# Patient Record
Sex: FEMALE
[De-identification: names, ages, dates, MRNs, and addresses within clinical notes are randomized per-mention and may not be internally consistent; named-entity substitution may affect disease eponyms.]

---

## 2022-05-31 ENCOUNTER — APPOINTMENT (OUTPATIENT)
Dept: ORTHOPEDIC SURGERY | Facility: CLINIC | Age: 22
End: 2022-05-31
Payer: MEDICAID

## 2022-05-31 VITALS — HEIGHT: 64 IN | WEIGHT: 155 LBS | BODY MASS INDEX: 26.46 KG/M2

## 2022-05-31 PROBLEM — Z00.00 ENCOUNTER FOR PREVENTIVE HEALTH EXAMINATION: Status: ACTIVE | Noted: 2022-05-31

## 2022-05-31 PROCEDURE — 99204 OFFICE O/P NEW MOD 45 MIN: CPT

## 2022-07-12 ENCOUNTER — APPOINTMENT (OUTPATIENT)
Dept: ORTHOPEDIC SURGERY | Facility: CLINIC | Age: 22
End: 2022-07-12

## 2022-08-23 ENCOUNTER — APPOINTMENT (OUTPATIENT)
Dept: ORTHOPEDIC SURGERY | Facility: CLINIC | Age: 22
End: 2022-08-23

## 2022-08-23 VITALS — HEIGHT: 64 IN | BODY MASS INDEX: 26.46 KG/M2 | WEIGHT: 155 LBS

## 2022-08-23 DIAGNOSIS — M43.16 SPONDYLOLISTHESIS, LUMBAR REGION: ICD-10-CM

## 2022-08-23 DIAGNOSIS — M43.07 SPONDYLOLYSIS, LUMBOSACRAL REGION: ICD-10-CM

## 2022-08-23 PROCEDURE — 99214 OFFICE O/P EST MOD 30 MIN: CPT

## 2022-08-23 NOTE — HISTORY OF PRESENT ILLNESS
[Lower back] : lower back [8] : 8 [10] : 10 [Constant] : constant [Walking/activity] : walking/activity [Sitting] : sitting [] : yes [de-identified] : 05/31/2022: Lumbar\par pt reports moderate to severe LBP with a minimal radiation to the LT lateral thigh. Pt reports that this started 2 weeks ago. Pain is present when seated for long periods of time and have to get up to walk to relieve pain. pain radiates down Left leg. 2 weeks ago pt developed parathesias over the anterolateral LT thigh which is intermitting.\par \par 08/23/2022:Lumbar X-Ray impression\par Pt reports the pain has returned since the end of July.  Pt reports the pain is present immediately with sitting and bending forwards.  Pt reports that she can walk without pain. pt reports she exercises at the gym, and stopped after symptoms arose. \par  \par \par  [FreeTextEntry1] : Lower back [FreeTextEntry8] :  with sitting

## 2022-08-23 NOTE — IMAGING
[de-identified] : Lumbar spine: \par ROM full flexion. Well tolerated extension is painful approximately L-4 to L-5 there is tenderness over the spinus processes. Lateral flexion in both direction in the same area. SLR is negative B/L. Pt able to heal walk and toe walk. SLR produces LBP only. Both knee and ankles are 2+ equal.\par \par tender over the spinus processes with a well localized most tender point at L5-S1 just below the level of the iliac crest.  this tender area on the midline corresponds to the location of all of her painful symptoms.  On exam the pain was mildly present with extension and was strongly present with forward flexion.  No radicular symptoms were produced during the exam.\par \par

## 2022-08-23 NOTE — DATA REVIEWED
[FreeTextEntry1] : An Xray was completed of the  Lumbar spine on  [date] \par The report was reviewed and noted in the chart, I independently reviewed and interpreted images and report and I reviewed the films/CD.  The MRI was reviewed with the patient.\par \par Impression: \par Spondylolysis. M43.06\par \par Spina bifida occulta. Q76.0\par \par Intrauterine contraceptive device visualized in the midline Z97.5

## 2022-08-23 NOTE — DISCUSSION/SUMMARY
[de-identified] : 1)MRI to eval well localized midline lumbar pain at L5 level without radicular complaints.  the pain started after heavy lifting.\par 2) Pt was advised to continue with activity modification and limit lifting exercise as tolerated. \par \par \par The patient will F/U after MRI.\par \par \par The patient was advised of the diagnosis.  The natural history of the pathology was explained in full to the patient in layman's terms, including but not limited to the risks, symptoms and available options for treatment.  We discussed the risks, benefits and alternatives of the treatment options and the advice I provided to the patient as listed above.  Pt was given the opportunity to ask questions, and all questions were answered.  The discussion was not limited to the above.\par \par Entered by Tre Zhong acting as scribe.\par

## 2022-08-30 ENCOUNTER — APPOINTMENT (OUTPATIENT)
Dept: MRI IMAGING | Facility: CLINIC | Age: 22
End: 2022-08-30

## 2022-09-06 ENCOUNTER — FORM ENCOUNTER (OUTPATIENT)
Age: 22
End: 2022-09-06

## 2022-09-07 ENCOUNTER — APPOINTMENT (OUTPATIENT)
Dept: MRI IMAGING | Facility: CLINIC | Age: 22
End: 2022-09-07

## 2022-09-07 PROCEDURE — 72148 MRI LUMBAR SPINE W/O DYE: CPT

## 2022-09-14 ENCOUNTER — APPOINTMENT (OUTPATIENT)
Dept: ORTHOPEDIC SURGERY | Facility: CLINIC | Age: 22
End: 2022-09-14

## 2022-10-10 ENCOUNTER — APPOINTMENT (OUTPATIENT)
Dept: ORTHOPEDIC SURGERY | Facility: CLINIC | Age: 22
End: 2022-10-10

## 2022-10-24 ENCOUNTER — APPOINTMENT (OUTPATIENT)
Dept: ORTHOPEDIC SURGERY | Facility: CLINIC | Age: 22
End: 2022-10-24

## 2023-06-13 NOTE — PHYSICAL EXAM
[FreeTextEntry3] : Lumbar spine: ROM full flexion well tolerated extension is painful Approx L-4 to L-5 there is tenderness over the spinus processes. Lateral flexion in both direction in the same area. SLR is negative B/L. Pt able to heal walk and toe walk. SLR produces LBP only. Both knee and ankles are 2+ equal.

## 2023-06-13 NOTE — DISCUSSION/SUMMARY
[de-identified] : 1) Start physical therapy\par 2) rest, activity modifications\par 3) nsaids prn \par \par \par The patient will continue with conservative treatment as described above, and will F/U in \par \par \par The patient was advised of the diagnosis.  The natural history of the pathology was explained in full to the patient in layman's terms, including but not limited to the risks, symptoms and available options for treatment.  We discussed the risks, benefits and alternatives of the treatment options and the advice I provided to the patient as listed above.  Pt was given the opportunity to ask questions, and all questions were answered.  The discussion was not limited to the above.\par \par Entered by Anu Blackburn acting as scribe.\par

## 2023-06-13 NOTE — HISTORY OF PRESENT ILLNESS
[Lower back] : lower back [8] : 8 [10] : 10 [Constant] : constant [Walking/activity] : walking/activity [Sitting] : sitting [de-identified] : 05/31/2022: Lumbar\par pt reports moderate to severe LBP with a minimal radiation to the LT lateral thigh. Pt reports that this started 2 weeks ago. Pain is present when seated for long periods of time and have to get up to walk to relieve pain. pain radiates down Left leg. 2 weeks ago pt developed parathesias over the anterolateral LT thigh which is intermitting. [FreeTextEntry8] :  with sitting